# Patient Record
Sex: MALE | NOT HISPANIC OR LATINO | Employment: FULL TIME | URBAN - METROPOLITAN AREA
[De-identification: names, ages, dates, MRNs, and addresses within clinical notes are randomized per-mention and may not be internally consistent; named-entity substitution may affect disease eponyms.]

---

## 2024-09-30 ENCOUNTER — OFFICE VISIT (OUTPATIENT)
Dept: URGENT CARE | Facility: URGENT CARE | Age: 29
End: 2024-09-30
Payer: COMMERCIAL

## 2024-09-30 VITALS
SYSTOLIC BLOOD PRESSURE: 141 MMHG | TEMPERATURE: 98.5 F | OXYGEN SATURATION: 100 % | DIASTOLIC BLOOD PRESSURE: 89 MMHG | HEART RATE: 68 BPM | RESPIRATION RATE: 17 BRPM | WEIGHT: 208 LBS

## 2024-09-30 DIAGNOSIS — L73.9 FOLLICULITIS: Primary | ICD-10-CM

## 2024-09-30 PROCEDURE — 99203 OFFICE O/P NEW LOW 30 MIN: CPT | Performed by: NURSE PRACTITIONER

## 2024-09-30 NOTE — PROGRESS NOTES
Assessment & Plan    Diagnosis Comments   1. Folliculitis  Recommend cleaning area twice daily with soap and water   Using a warm moist compress twice daily  Do not pick or try to pop area.  May cover with a bandage   Tylenol or ibuprofen as needed for discomfort    We discussed red flag symptoms that would warrant emergent or urgent evaluation patient will monitor closely for symptoms of infection.  Patient verbalized understanding was agreed with plan            [facility]    Marco Salcedo is a 28 year old male who presents to clinic today for the following health issues:  Chief Complaint   Patient presents with    Cyst     Cyst on left side of neck since Saturday. Increasing in size, some drainage.        HPI    Patient presents to clinic with left sided neck irritation along the hairline swelling mild tenderness.  States he noted this yesterday.    Review of Systems  Constitutional, HEENT, cardiovascular, pulmonary, gi and gu systems are negative, except as otherwise noted.      Objective    BP (!) 141/89 (BP Location: Left arm, Cuff Size: Adult Regular)   Pulse 68   Temp 98.5  F (36.9  C) (Tympanic)   Resp 17   Wt 94.3 kg (208 lb)   SpO2 100%   Physical Exam   GENERAL: alert and no distress  NECK: no adenopathy, no asymmetry, masses, or scars  RESP: lungs clear to auscultation - no rales, rhonchi or wheezes  MS: no gross musculoskeletal defects noted, no edema  SKIN: Left neck near hairline small nodule noted with punctate center negative for erythema warmth or drainage.  Negative for fluctuance.